# Patient Record
Sex: FEMALE | Race: WHITE | Employment: FULL TIME | URBAN - METROPOLITAN AREA
[De-identification: names, ages, dates, MRNs, and addresses within clinical notes are randomized per-mention and may not be internally consistent; named-entity substitution may affect disease eponyms.]

---

## 2021-03-22 PROBLEM — Z97.5 IUD (INTRAUTERINE DEVICE) IN PLACE: Status: ACTIVE | Noted: 2021-03-16

## 2022-03-18 ENCOUNTER — HOSPITAL ENCOUNTER (EMERGENCY)
Age: 32
Discharge: HOME | End: 2022-03-18
Attending: EMERGENCY MEDICINE

## 2022-03-18 ENCOUNTER — APPOINTMENT (OUTPATIENT)
Dept: GENERAL RADIOLOGY | Age: 32
End: 2022-03-18

## 2022-03-18 VITALS
WEIGHT: 184 LBS | TEMPERATURE: 98.8 F | BODY MASS INDEX: 34.74 KG/M2 | HEART RATE: 98 BPM | SYSTOLIC BLOOD PRESSURE: 126 MMHG | RESPIRATION RATE: 16 BRPM | OXYGEN SATURATION: 98 % | HEIGHT: 61 IN | DIASTOLIC BLOOD PRESSURE: 84 MMHG
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
## 2022-03-18 DIAGNOSIS — R07.89 CHEST WALL PAIN: Primary | ICD-10-CM

## 2022-03-18 PROCEDURE — 71046 X-RAY EXAM CHEST 2 VIEWS: CPT

## 2022-03-18 PROCEDURE — 99283 EMERGENCY DEPT VISIT LOW MDM: CPT

## 2022-03-18 NOTE — ED NOTES
I have reviewed discharge instructions with the patient. The patient verbalized understanding. Patient left ED via Discharge Method: ambulatory to Home with self    Opportunity for questions and clarification provided. Patient given 0 scripts. To continue your aftercare when you leave the hospital, you may receive an automated call from our care team to check in on how you are doing. This is a free service and part of our promise to provide the best care and service to meet your aftercare needs.  If you have questions, or wish to unsubscribe from this service please call [phone]. Thank you for Choosing our [facility] Emergency Department.
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
## 2022-03-18 NOTE — DISCHARGE INSTRUCTIONS
Your evaluated in the emergency department today for right-sided chest wall pain. X-rays of your chest were normal.    Please follow-up with primary care provider  I recommend taking over-the-counter NSAIDs pain control    Return to emergency department if chest wall pain becomes worse, you have shortness of breath, signs and symptoms of stroke as listed in your discharge paperwork, chest pain.

## 2022-03-19 PROBLEM — Z97.5 IUD (INTRAUTERINE DEVICE) IN PLACE: Status: ACTIVE | Noted: 2021-03-16

## 2023-10-03 SDOH — ECONOMIC STABILITY: FOOD INSECURITY: WITHIN THE PAST 12 MONTHS, YOU WORRIED THAT YOUR FOOD WOULD RUN OUT BEFORE YOU GOT MONEY TO BUY MORE.: NEVER TRUE

## 2023-10-03 SDOH — ECONOMIC STABILITY: TRANSPORTATION INSECURITY
IN THE PAST 12 MONTHS, HAS LACK OF TRANSPORTATION KEPT YOU FROM MEETINGS, WORK, OR FROM GETTING THINGS NEEDED FOR DAILY LIVING?: NO

## 2023-10-03 SDOH — ECONOMIC STABILITY: FOOD INSECURITY: WITHIN THE PAST 12 MONTHS, THE FOOD YOU BOUGHT JUST DIDN'T LAST AND YOU DIDN'T HAVE MONEY TO GET MORE.: NEVER TRUE

## 2023-10-03 SDOH — ECONOMIC STABILITY: HOUSING INSECURITY
IN THE LAST 12 MONTHS, WAS THERE A TIME WHEN YOU DID NOT HAVE A STEADY PLACE TO SLEEP OR SLEPT IN A SHELTER (INCLUDING NOW)?: NO

## 2023-10-03 SDOH — ECONOMIC STABILITY: INCOME INSECURITY: HOW HARD IS IT FOR YOU TO PAY FOR THE VERY BASICS LIKE FOOD, HOUSING, MEDICAL CARE, AND HEATING?: NOT HARD AT ALL

## 2023-10-06 ENCOUNTER — OFFICE VISIT (OUTPATIENT)
Dept: OBGYN CLINIC | Age: 33
End: 2023-10-06
Payer: COMMERCIAL

## 2023-10-06 VITALS
WEIGHT: 183 LBS | HEIGHT: 61 IN | DIASTOLIC BLOOD PRESSURE: 80 MMHG | BODY MASS INDEX: 34.55 KG/M2 | SYSTOLIC BLOOD PRESSURE: 112 MMHG

## 2023-10-06 DIAGNOSIS — Z11.51 SCREENING FOR HPV (HUMAN PAPILLOMAVIRUS): ICD-10-CM

## 2023-10-06 DIAGNOSIS — Z12.4 PAP SMEAR FOR CERVICAL CANCER SCREENING: ICD-10-CM

## 2023-10-06 DIAGNOSIS — Z01.419 WELL WOMAN EXAM WITH ROUTINE GYNECOLOGICAL EXAM: Primary | ICD-10-CM

## 2023-10-06 PROCEDURE — G8484 FLU IMMUNIZE NO ADMIN: HCPCS | Performed by: OBSTETRICS & GYNECOLOGY

## 2023-10-06 PROCEDURE — 99395 PREV VISIT EST AGE 18-39: CPT | Performed by: OBSTETRICS & GYNECOLOGY

## 2023-10-06 RX ORDER — LEVONORGESTREL 52 MG/1
1 INTRAUTERINE DEVICE INTRAUTERINE ONCE
COMMUNITY

## 2023-10-06 NOTE — PROGRESS NOTES
CC:  Annual GYN exam    HPI:  29 y.o. No obstetric history on file. presents today for a routine gynecological examination. No LMP recorded. (Menstrual status: IUD). .    On Celexa 40 mg every day for anxiety. PCP: Yes     [place]race[place]  Chad Pantoja [place]      Menses:  light spotting       Sexually active w/ male partner   No changes in sexual partners --declines STD testing          Ob hx:  G0        GYN HISTORY:  As per HPI     Last Pap:  3/2021  Hx of Abnl Paps: yes    Hx STDs: no  Gardasil vaccine: Completed        OB History    No obstetric history on file. Past Medical History:   Diagnosis Date    Abnormal Papanicolaou smear of cervix     Anxiety     COVID-19     HPV vaccine counseling 03/2021    Thinks she had Gardasil, will verify w/ her mom         Past Surgical History:   Procedure Laterality Date    WISDOM TOOTH EXTRACTION           Outpatient Encounter Medications as of 10/6/2023   Medication Sig Dispense Refill    levonorgestrel (LILETTA, 52 MG,) 20.1 MCG/DAY IUD IUD 52 mg 1 each by IntraUTERine route once      citalopram (CELEXA) 40 MG tablet Take 1 tablet by mouth daily       No facility-administered encounter medications on file as of 10/6/2023. Allergies   Allergen Reactions    Penicillins Hives         Family History   Problem Relation Age of Onset    Heart Disease Maternal Grandfather     Colon Cancer Paternal Grandmother     Osteoporosis Maternal Grandmother     Hypertension Father     Cancer Mother         \"cervical cancer\" didn't require surgery, ? radiation/chemo? Social History     Socioeconomic History    Marital status: Single   Tobacco Use    Smoking status: Never    Smokeless tobacco: Never   Substance and Sexual Activity    Alcohol use:  Yes     Alcohol/week: 2.0 - 3.0 standard drinks of alcohol     Types: 2 - 3 Glasses of wine per week    Drug use: Never    Sexual activity: Not Currently     Partners: Male     Birth control/protection:

## 2023-10-11 LAB
CYTOLOGIST CVX/VAG CYTO: NORMAL
CYTOLOGY CVX/VAG DOC THIN PREP: NORMAL
HPV APTIMA: NEGATIVE
HPV GENOTYPE REFLEX: NORMAL
Lab: NORMAL
PATH REPORT.FINAL DX SPEC: NORMAL
STAT OF ADQ CVX/VAG CYTO-IMP: NORMAL

## 2024-06-28 ENCOUNTER — HOSPITAL ENCOUNTER (EMERGENCY)
Age: 34
Discharge: HOME | End: 2024-06-28
Payer: COMMERCIAL

## 2024-06-28 VITALS
BODY MASS INDEX: 35.9 KG/M2 | SYSTOLIC BLOOD PRESSURE: 118 MMHG | RESPIRATION RATE: 18 BRPM | TEMPERATURE: 98.4 F | OXYGEN SATURATION: 99 % | DIASTOLIC BLOOD PRESSURE: 70 MMHG | WEIGHT: 190 LBS | HEART RATE: 81 BPM

## 2024-06-28 DIAGNOSIS — H57.11 ACUTE RIGHT EYE PAIN: Primary | ICD-10-CM

## 2024-06-28 PROCEDURE — 6370000000 HC RX 637 (ALT 250 FOR IP)

## 2024-06-28 PROCEDURE — 99283 EMERGENCY DEPT VISIT LOW MDM: CPT

## 2024-06-28 RX ORDER — TETRACAINE HYDROCHLORIDE 5 MG/ML
1 SOLUTION OPHTHALMIC
Status: COMPLETED | OUTPATIENT
Start: 2024-06-28 | End: 2024-06-28

## 2024-06-28 RX ADMIN — FLUORESCEIN SODIUM 1 MG: 1 STRIP OPHTHALMIC at 19:05

## 2024-06-28 RX ADMIN — TETRACAINE HYDROCHLORIDE 1 DROP: 5 SOLUTION OPHTHALMIC at 19:05

## 2024-06-28 ASSESSMENT — PAIN - FUNCTIONAL ASSESSMENT
PAIN_FUNCTIONAL_ASSESSMENT: NONE - DENIES PAIN
PAIN_FUNCTIONAL_ASSESSMENT: NONE - DENIES PAIN

## 2024-06-28 ASSESSMENT — ENCOUNTER SYMPTOMS
ALLERGIC/IMMUNOLOGIC NEGATIVE: 1
GASTROINTESTINAL NEGATIVE: 1
EYE PAIN: 1
RESPIRATORY NEGATIVE: 1
EYE REDNESS: 1

## 2024-06-28 ASSESSMENT — PAIN SCALES - GENERAL: PAINLEVEL_OUTOF10: 0

## 2024-06-28 NOTE — DISCHARGE INSTRUCTIONS
As we discussed, your eye exam was negative for any abrasion.  You can wash out the fluorescein at home.  This may take a couple days for this to completely subside.

## 2024-06-28 NOTE — ED PROVIDER NOTES
Emergency Department Provider Note       PCP: Pearl Cardona DO   Age: 33 y.o.   Sex: female     DISPOSITION Decision To Discharge 06/28/2024 07:20:42 PM       ICD-10-CM    1. Acute right eye pain  H57.11           Medical Decision Making     This is a 33-year-old female who arrives to the emergency department for complaints of right eye irritation and pain.  Patient reports she was able to remove half contacts at home and the other half was removed here by nursing staff.  Fluorescein exam is negative for any uptake.  Patient is appropriate for discharge.  Strict return precautions were discussed with patient which he verbalizes understanding.     1 acute, uncomplicated illness or injury.  Over the counter drug management performed.  Patient was discharged risks and benefits of hospitalization were considered.  Shared medical decision making was utilized in creating the patients health plan today.    I independently ordered and reviewed each unique test.  I reviewed external records: ED visit note from an outside group.  I reviewed external records: provider visit note from PCP.    history provided by patient.                History     This is a 33-year-old well-appearing female with no past medical history arrives to the emergency department for complaints of right eye pain and irritation has been present since prior to arrival.  Patient reports she is in the shower to rub her eye and subsequently split her contact in half.  Patient reports she got out what happened the other half was in the eye.  Upon arrival to the emergency department, the nurse was able to remove the other half.  She denies any visual deficits or changes at this time.  She does endorse scleral redness at this time.  No other complaints.    ROS     Review of Systems   Constitutional: Negative.    HENT: Negative.     Eyes:  Positive for pain (Subjective prior to contact removal.) and redness (Right eye).   Respiratory: Negative.

## 2024-06-28 NOTE — ED TRIAGE NOTES
Pt coming in for half a contact stuck in her right eye. Contact removed by triage nurse. Pt states pain is relieved instantly. Eye is red and irritated.

## 2024-10-15 SDOH — ECONOMIC STABILITY: FOOD INSECURITY: WITHIN THE PAST 12 MONTHS, YOU WORRIED THAT YOUR FOOD WOULD RUN OUT BEFORE YOU GOT MONEY TO BUY MORE.: NEVER TRUE

## 2024-10-15 SDOH — ECONOMIC STABILITY: INCOME INSECURITY: HOW HARD IS IT FOR YOU TO PAY FOR THE VERY BASICS LIKE FOOD, HOUSING, MEDICAL CARE, AND HEATING?: NOT HARD AT ALL

## 2024-10-15 SDOH — ECONOMIC STABILITY: FOOD INSECURITY: WITHIN THE PAST 12 MONTHS, THE FOOD YOU BOUGHT JUST DIDN'T LAST AND YOU DIDN'T HAVE MONEY TO GET MORE.: NEVER TRUE

## 2024-10-18 ENCOUNTER — OFFICE VISIT (OUTPATIENT)
Dept: OBGYN CLINIC | Age: 34
End: 2024-10-18
Payer: COMMERCIAL

## 2024-10-18 VITALS
BODY MASS INDEX: 36.63 KG/M2 | HEIGHT: 61 IN | SYSTOLIC BLOOD PRESSURE: 122 MMHG | DIASTOLIC BLOOD PRESSURE: 74 MMHG | WEIGHT: 194 LBS

## 2024-10-18 DIAGNOSIS — Z01.419 WELL WOMAN EXAM WITH ROUTINE GYNECOLOGICAL EXAM: Primary | ICD-10-CM

## 2024-10-18 PROCEDURE — G8484 FLU IMMUNIZE NO ADMIN: HCPCS | Performed by: OBSTETRICS & GYNECOLOGY

## 2024-10-18 PROCEDURE — 99395 PREV VISIT EST AGE 18-39: CPT | Performed by: OBSTETRICS & GYNECOLOGY

## 2024-10-18 PROCEDURE — 99459 PELVIC EXAMINATION: CPT | Performed by: OBSTETRICS & GYNECOLOGY

## 2024-10-18 NOTE — PROGRESS NOTES
CC:  Annual GYN exam    HPI:  34 y.o.  No obstetric history on file. presents today for a routine gynecological examination.  No LMP recorded. (Menstrual status: IUD)..    On Celexa 40 mg every day for anxiety. Doing great on this.      PCP: Yes     Contrace[place]:  Liletta [place]     Menses:  None with IUD    Ob hx:  G0    GYN HISTORY:  As per HPI     Last Pap:  2023 negative     OB History    No obstetric history on file.           Past Medical History:   Diagnosis Date    Abnormal Papanicolaou smear of cervix     Anxiety     COVID-19     HPV vaccine counseling 03/2021    Thinks she had Gardasil, will verify w/ her mom         Past Surgical History:   Procedure Laterality Date    WISDOM TOOTH EXTRACTION           Outpatient Encounter Medications as of 10/18/2024   Medication Sig Dispense Refill    levonorgestrel (LILETTA, 52 MG,) 20.1 MCG/DAY IUD IUD 52 mg 1 each by IntraUTERine route once      citalopram (CELEXA) 40 MG tablet Take 1 tablet by mouth daily       No facility-administered encounter medications on file as of 10/18/2024.         Allergies   Allergen Reactions    Penicillins Hives         Family History   Problem Relation Age of Onset    Heart Disease Maternal Grandfather     Colon Cancer Paternal Grandmother     Osteoporosis Maternal Grandmother     Hypertension Father     Cancer Mother         \"cervical cancer\" didn't require surgery, ? radiation/chemo?         Social History     Socioeconomic History    Marital status: Single   Tobacco Use    Smoking status: Never    Smokeless tobacco: Never   Substance and Sexual Activity    Alcohol use: Yes     Alcohol/week: 2.0 - 3.0 standard drinks of alcohol     Types: 2 - 3 Glasses of wine per week    Drug use: Never    Sexual activity: Not Currently     Partners: Male     Birth control/protection: I.U.D.     Social Determinants of Health     Financial Resource Strain: Low Risk  (10/26/2022)    [facility] [employer]

## 2025-07-18 ENCOUNTER — OFFICE VISIT (OUTPATIENT)
Dept: OBGYN CLINIC | Age: 35
End: 2025-07-18

## 2025-07-18 VITALS — BODY MASS INDEX: 35.75 KG/M2 | WEIGHT: 189.2 LBS | DIASTOLIC BLOOD PRESSURE: 62 MMHG | SYSTOLIC BLOOD PRESSURE: 115 MMHG

## 2025-07-18 DIAGNOSIS — Z11.8 SCREENING EXAMINATION FOR PARASITIC INFECTION: ICD-10-CM

## 2025-07-18 DIAGNOSIS — Z11.3 SCREENING FOR STDS (SEXUALLY TRANSMITTED DISEASES): Primary | ICD-10-CM

## 2025-07-18 DIAGNOSIS — Z11.3 SCREENING FOR STDS (SEXUALLY TRANSMITTED DISEASES): ICD-10-CM

## 2025-07-18 LAB
HBV SURFACE AG SER QL: NONREACTIVE
HCV AB SER QL: NONREACTIVE
HIV 1+2 AB+HIV1 P24 AG SERPL QL IA: NONREACTIVE
HIV 1/2 RESULT COMMENT: NORMAL
T PALLIDUM AB SER QL IA: NONREACTIVE

## 2025-07-18 NOTE — PROGRESS NOTES
CC:   Chief Complaint   Patient presents with    Other     STD Just screening         HPI:    Eneida  is a 34 y.o. , No obstetric history on file., No LMP recorded. (Menstrual status: IUD).,  who is seen today for routine STD testing.     The patient is patient of Dr. Galaviz with last visit on October 18, 2024.     The patient denies any known exposure. She also denies abnormal vaginal discharge, itching, odor, irritation, urinary frequency, urgency or dysuria today.       Contraception[place] Liletta [place]Condoms    She does not have menses since IUD placed.   Denies dysmenorrhea.       Sexualy active with male partner. Would like full std testing on Nuswab and Serology today.   Denies post coital VB or dyspareunia.        GYN HISTORY:  As per HPI     Hx STDs: Hx chlamydia years ago    Last Pap: 10/2023-Neg cotesting    Hx abnormal paps: States did have abnormal one a few years ago; denies biopsy. States \"they did watch it and when repeated, it was normal.\"           Current Outpatient Medications on File Prior to Visit   Medication Sig Dispense Refill    levonorgestrel (LILETTA, 52 MG,) 20.1 MCG/DAY IUD IUD 52 mg 1 each by IntraUTERine route once      citalopram (CELEXA) 40 MG tablet Take 1 tablet by mouth daily       No current facility-administered medications on file prior to visit.         Past Medical History:   Diagnosis Date    Abnormal Papanicolaou smear of cervix     Anxiety     COVID-19     HPV vaccine counseling 03/2021    Thinks she had Gardasil, will verify w/ her mom         Past Surgical History:   Procedure Laterality Date    WISDOM TOOTH EXTRACTION           Outpatient Encounter Medications as of 7/18/2025   Medication Sig Dispense Refill    levonorgestrel (LILETTA, 52 MG,) 20.1 MCG/DAY IUD IUD 52 mg 1 each by IntraUTERine route once      citalopram (CELEXA) 40 MG tablet Take 1 tablet by mouth daily       No facility-administered encounter medications on file as of 7/18/2025.

## 2025-07-21 LAB
C TRACH RRNA SPEC QL NAA+PROBE: NEGATIVE
N GONORRHOEA RRNA SPEC QL NAA+PROBE: NEGATIVE
SPECIMEN SOURCE: NORMAL
T VAGINALIS RRNA SPEC QL NAA+PROBE: NEGATIVE